# Patient Record
Sex: MALE | Race: WHITE | NOT HISPANIC OR LATINO | Employment: STUDENT | URBAN - METROPOLITAN AREA
[De-identification: names, ages, dates, MRNs, and addresses within clinical notes are randomized per-mention and may not be internally consistent; named-entity substitution may affect disease eponyms.]

---
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
## 2024-12-02 NOTE — PROGRESS NOTES
[facility]   [address], [address]  [place] [address]  [phone]  Courtney Fowler, MSN, APRN, PMHNP-BC    NAME: Mariano Ashby     : 2005   MRN: [id number]     Patient Care Team:  Mary Hankins MD as PCP - General (Pediatrics)    DATE: 2024    -Patient was referred by: Jay Ewing LCS  -Psychiatric history packet turned in/reviewed : [x] Yes [] No    Subjective     CHIEF COMPLAINT: anxiety     HPI:  Mariano Ashby, a 19 y.o. male patient seen for the first time today for initial evaluation.    Referred by [facility]. Patient sees a therapist there, Gildardo Thompson. He has been seeing him for anxiety and depression. Reports he doesn't have a regular schedule, but just calls and schedules an appointment when he needs to talk.     He recently started on citalopram 10mg, about 2 weeks ago, by him primary care provider. Reports he was having panic attacks.     Symptoms of panic attacks: very nervous, fidgety, says he feels as though he can't breathe.   Triggers to anxiety: He says women are a trigger to him. He says he has anxiety around women, and he doesn't know how to act around them. He says he was made fun of a lot by women in high school.     Currently he lives with his mom. Reports they have a good relationship.     Other issues he states he has. Patient says he isn't very self-aware.  Says he worries he has bipolar disorder or a personality disorder. He says he has a hard time reading and responding to social cues. He says sometimes when he is talking to his mother, he will become annoyed or irritable with her for no particular reason. He reports that he is matos, and has rapid mood swings.     Patient states he excessively masturbates, every day since middle school. He has not talked to his therapist about it. He feels like they have a relationship where he would be embarrassed to talk about it. Patient states he  "does feel like it is an obsession and he feels he has to do it. He describes it as being \"addicted\" to it.     He currently works at [employer]. Says he will start talking to himself, while he is working. 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 He says coworkers will be alarmed that he is talking to himself. He has thoughts about someone hurting his mother. And he has rage filled thoughts about this. He will whisper about this to himself. It makes it difficult to work, he says. He is adamant that he is not talking to someone, as in responding to internal stimuli, and is in fact just talking to himself. He denies ever having any auditory or visual hallucinations. He did not bring up any delusional content. Denies paranoia, but does say he worries what others think of him.     Family Psychiatric History:   Grandfather's mother--he says he was told she had serious mental health issues. Possibly schizophrenia. He is unsure if she was officially diagnosed.   Cousins--he says all his cousins have anxiety problems.     Patient denies any episiodes of suzanne or hypomania in the past.     Childhood: He states childhood was \"alright\". He says he was never abused, and his parents both loved him, but said their relationship was \"toxic\". They  when he was 5. He still talks to his dad often now. He made some comments about the marriage, but asked me to not \"put them in the official record\" in case what his mother had told him wasn't true.     Patient states he has a history of suicidal thoughts in the past. States he had thoughts, and almost made an attempt. Around 4th-6th grade. He again told me what the attempt was, but ask that I not put it in his chart. He say at the time that occurred, he never told anyone about it. I attempted to reassure him that my note taking, was simply to have a record of his psychiatric history, and be able to refer back to it in the future to refresh my memory. No one has access to the record, unless he gives them " "access to MyChart. I educated him on the things we are required to disclose by law, such as suicidal thoughts with intent, or homicidal thoughts with intent, but everything else he talked about would be kept private and confidential.     Patient states everyone thought he was \"weird\" growing up and did not treat him well.     Patient states he is \"always stressed, always anxious, always angry.\"  Stressors: work, relationships.     He states he has never had a relationship. Says he gets nervous around women and he doesn't know how to approach them or make conversation.   States he has good friends from high school/    Hobbies: video games, TV.       SYMPTOMS:      MOOD: down/low     SLEEP: poor     ENERGY: average      CONCENTRATION/FOCUS: extremely poor     APPETITE: poor    PRIOR PSYCH MEDICATIONS:  Adderall--he was on this as a child  Zoloft--middle or high school  He says he has been on several medications in the past, but he doesn't remember the name.     PRIOR PSYCH DX:   Depression   Anxiety   ADHD--diagnosed as a child.     PRIOR MENTAL HEALTH PROVIDERS:  Gildardo Thompson LCSW--currently sees him.   Patient has other providers in the past, when he was a child/teen.     PSYCH ADMISSIONS:   He denies any psychiatric admissions.     SELF HARM/SUICIDALLY:   Denies any current SI with any plan or intent.     SOCIAL HISTORY:  Relationships: patient not in a relationship.   Education: Graduated high school.   Developmenal HX (504, IEP, milestones, complications at birth): He reports he was evaluated for autism as a child and was told he didn't have it.   Occupation: Works at [employer].   Living Arrangements: lives with his mom.   Trauma (emotional, psychological, physical, sexual) : Denies any history of trauma.   Legal: denies   Hobbies: video games.      SUBSTANCE USE:   Nicotine/Tobacco: E-cigarettes  Alcohol: socially   Illicit drugs: denies any past use   Cannabis/Marijuana: he uses marijuana   Caffeine: he drinks " "caffeine.     SEIZURE HISTORY: No    PLAN OF CARE:  Advised patient we could do a trial of increasing citalopram to 20mg. He was agreeable to this. Advised we could refer him to [facility] for further psychological testing. Patient is interested in testing for autism and potentially for personality disorders.     I also encouraged the patient to schedule a regular appointment with his therapist and discuss some of the things he discussed with me today, specifically the excessive masturbation, as this is distressing to him, and he is not sure how to cope with it.     Patient presents with symptoms and behaviors that are consistent with the following DSM-5 diagnoses:  Major depressive disorder  2. Generalized anxiety disorder   3. OCD     Ability and capacity to respond to treatment: fair  Functional status: fair  Prognosis: fair  Long term goals: improve depression and overall quality of life. , improve anxiety and overall quality of life. , and decreased distressing symptoms.   Short term goals:reduce anxiety. , improve depression. , and decrease irritability.     Objective     /62   Pulse 72   Ht 167.6 cm (65.98\")   Wt 67.6 kg (149 lb)   SpO2 98%   BMI 24.06 kg/m²   No LMP for male patient.    Social History     Occupational History    Not on file   Tobacco Use    Smoking status: Every Day     Types: Cigarettes    Smokeless tobacco: Never   Vaping Use    Vaping status: Every Day    Substances: Nicotine, THC, Flavoring    Devices: Disposable   Substance and Sexual Activity    Alcohol use: Yes     Comment: socially    Drug use: Yes     Frequency: 1.0 times per week     Types: Marijuana    Sexual activity: Not Currently     History reviewed. No pertinent family history.   Past Medical History:   Diagnosis Date    ADHD (attention deficit hyperactivity disorder)     Anxiety      History reviewed. No pertinent surgical history.   Review of Systems     The following portions of the patient's history " were reviewed and updated as appropriate: allergies, current medications, past family history, past medical history, past social history, past surgical history and problem list.      Allergy:   No Known Allergies     Discontinued Medications:  Medications Discontinued During This Encounter   Medication Reason    loratadine (Claritin) 5 MG chewable tablet     promethazine-dextromethorphan (PROMETHAZINE-DM) 6.25-15 MG/5ML syrup     citalopram (CeleXA) 10 MG tablet Reorder       Current Medications:   Current Outpatient Medications   Medication Sig Dispense Refill    citalopram (CeleXA) 20 MG tablet Take 1 tablet by mouth Daily. 30 tablet 2     No current facility-administered medications for this visit.     MENTAL STATUS EXAM   General Appearance:  Cleanly groomed and dressed  Eye Contact:  Poor eye contact  Attitude:  Cooperative  Motor Activity: Patient has some head/neck movements, similar to a tic.  Muscle Strength:  Normal  Speech:  Normal rate, tone, volume  Language:  Spontaneous  Mood and affect:  Flat and constricted  Hopelessness:  Denies  Loneliness: Patient does endorse loneliness.  Thought Process:  Linear  Associations/ Thought Content:  Hypersexual  Hallucinations: Patient denies. He does describe episodes of talking to himself at work, that sound similar to responding to internal stimuli. However, I did not observe this today.  Suicidal Ideations:  Not present  Homicidal Ideation:  Not present  Sensorium:  Alert  Orientation:  Person, place and time  Immediate Recall, Recent, and Remote Memory:  Intact  Attention Span/ Concentration:  Easily distracted  Fund of Knowledge:  Appropriate for age and educational level  Intellectual Functioning:  Average range  Insight:  Fair  Judgement:  Fair  Reliability:  Fair  Impulse Control:  Fair     PHQ-9 Depression Screening  Little interest or pleasure in doing things? Several days   Feeling down, depressed, or hopeless? Several days   PHQ-2 Total Score 2    Trouble falling or staying asleep, or sleeping too much? Almost all   Feeling tired or having little energy? Several days   Poor appetite or overeating? Over half   Feeling bad about yourself - or that you are a failure or have let yourself or your family down? Almost all   Trouble concentrating on things, such as reading the newspaper or watching television? Not at all   Moving or speaking so slowly that other people could have noticed? Or the opposite - being so fidgety or restless that you have been moving around a lot more than usual? Almost all   Thoughts that you would be better off dead, or of hurting yourself in some way? Not at all   PHQ-9 Total Score 14   If you checked off any problems, how difficult have these problems made it for you to do your work, take care of things at home, or get along with other people? Very difficult     GAD7 Documentation:  Feeling nervous, anxious or on edge 3   Not being able to stop or control worrying 3   Worrying too much about different things 3   Trouble relaxing 2   Being so restless that it is hard to sit still 3   Becoming easily annoyed or irritable 3   Feeling Afraid as if something awful might happen 3   KIMBERLEY Total Score 20   How difficult have these problems made it for you? Very difficult     Current every day smoker less than 3 minutes spent counseling Not agreeable to stopping    I advised Mariano of the risks of tobacco use.     Result Review:    Labs:  No visits with results within 3 Month(s) from this visit.   Latest known visit with results is:   Admission on 12/02/2023, Discharged on 12/02/2023   Component Date Value Ref Range Status    SARS Antigen 12/02/2023 Detected (A)  Not Detected, Presumptive Negative Final    Influenza A Antigen JEWELS 12/02/2023 Not Detected  Not Detected Final    Influenza B Antigen JEWELS 12/02/2023 Not Detected  Not Detected Final    Internal Control 12/02/2023 Passed  Passed Final    Lot Number 12/02/2023 [id number]   Final     Expiration Date 12/02/2023 11/1/24   Final       Assessment & Plan   Diagnoses and all orders for this visit:    1. Obsessive-compulsive disorder, unspecified type (Primary)    2. Major depressive disorder, recurrent episode, moderate  -     Ambulatory Referral to Behavioral Health  -     citalopram (CeleXA) 20 MG tablet; Take 1 tablet by mouth Daily.  Dispense: 30 tablet; Refill: 2    3. Generalized anxiety disorder  -     citalopram (CeleXA) 20 MG tablet; Take 1 tablet by mouth Daily.  Dispense: 30 tablet; Refill: 2       Continue citalopram, increase to 20mg.   Referral to [facility] for psychological testing.   Encouraged patient to schedule follow up/regular appointment with his therapist and discuss the symptoms that are distressing to him.     Visit Diagnoses:    ICD-10-CM ICD-9-CM   1. Obsessive-compulsive disorder, unspecified type  F42.9 300.3   2. Major depressive disorder, recurrent episode, moderate  F33.1 296.32   3. Generalized anxiety disorder  F41.1 300.02       The above listed condition/conditions are no improvement  (new patient) with treatment, moderate intensity.  Pt history, review of systems, medications, allergies, reviewed, patient was screened today for depression/anxiety, PHQ/KIBMERLEY scores reviewed.  Most recent vitals/labs reviewed.  Pt was given appropriate time to ask questions and concerns were addressed. A thorough discussion was had that included review of disease process, need for continued monitoring and additional treatment options including use of pharmacological and non-pharmacological approaches to care, decisions were made and agreed upon by patient and provider.   Discussed the risks, benefits, and potential side effects of the medications; patient ackowledged and verbally consented.     TREATMENT PLAN/GOALS: Continue supportive psychotherapy efforts and medications as indicated. Treatment and medication options discussed during today's visit. Patient ackowledged  and verbally consented to continue with current treatment plan and was educated on the importance of compliance with treatment and follow-up appointments.    -Short-Term Goals: Patient will be compliant with medication management and note improvement in S/S over the next 4 to 6 weeks or at next scheduled visit.  -Long-Term Goals: Patient will be compliant with the agreed treatment plan including medication regimen & F/U appt's and deny impairment in daily functioning over the next 6 months.      CRISIS RESOURCES:    In the event you have personal crisis, there are several resources to reach someone to talk with:    709 Suicide and Crisis Lifeline  Call or text 108 or chat [employer]  [facility]'s National Helpline  8-691-651-HELP (4357)  Text your zip code to 802746 (HELP4U)  [place] Crisis Line  Dial 854, then press 1  Text 892487    No show policy:  We understand unexpected circumstances arise; however, anytime you miss your appointment we are unable to provide you appropriate care.  In addition, each appointment missed could have been used to provide care for others.  We ask that you call at least 24 hours in advance to cancel or reschedule an appointment. We would like to take this opportunity to remind you of our policy stating patients who miss THREE appointments without cancelling or rescheduling 24 hours in advance of the appointment may be subject to cancellation of any further visits with our clinic. Please call [phone] to reschedule your appointment. If there are reasons that make it difficult for you to keep the appointments, please call and let us know how we can help. Please understand that medication prescribing will not continue without seeing your provider.        MEDICATION ISSUES:  INSPECT reviewed as expected    Discussed medication options and treatment plan of prescribed medication as well as the risks, benefits, and side effects including potential falls, possible impaired driving and  metabolic adversities among others. Patient is agreeable to call the office with any worsening of symptoms or onset of side effects. Patient is agreeable to call 911 or go to the nearest ER should he/she begin having SI/HI. No medication side effects or related complaints today.     MEDS ORDERED DURING VISIT:  New Medications Ordered This Visit   Medications    citalopram (CeleXA) 20 MG tablet     Sig: Take 1 tablet by mouth Daily.     Dispense:  30 tablet     Refill:  2       Return in about 3 months (around 3/5/2025).         This document has been electronically signed by TRINA Joyner  December 5, 2024 19:19 EST    Part of this note may be an electronic transcription/translation of spoken language to printed text using the Dragon Dictation System. Some of the data in this electronic note has been brought forward from a previous encounter, any necessary changes have been made, it has been reviewed by this APRN, and it is accurate.

## 2024-12-05 ENCOUNTER — OFFICE VISIT (OUTPATIENT)
Dept: PSYCHIATRY | Facility: CLINIC | Age: 19
End: 2024-12-05
Payer: COMMERCIAL

## 2024-12-05 VITALS
HEART RATE: 72 BPM | OXYGEN SATURATION: 98 % | SYSTOLIC BLOOD PRESSURE: 106 MMHG | HEIGHT: 66 IN | WEIGHT: 149 LBS | DIASTOLIC BLOOD PRESSURE: 62 MMHG | BODY MASS INDEX: 23.95 KG/M2

## 2024-12-05 DIAGNOSIS — F33.1 MAJOR DEPRESSIVE DISORDER, RECURRENT EPISODE, MODERATE: Chronic | ICD-10-CM

## 2024-12-05 DIAGNOSIS — F42.9 OBSESSIVE-COMPULSIVE DISORDER, UNSPECIFIED TYPE: Primary | Chronic | ICD-10-CM

## 2024-12-05 DIAGNOSIS — F41.1 GENERALIZED ANXIETY DISORDER: Chronic | ICD-10-CM

## 2024-12-05 RX ORDER — CITALOPRAM HYDROBROMIDE 10 MG/1
10 TABLET ORAL DAILY
COMMUNITY
End: 2024-12-05 | Stop reason: SDUPTHER

## 2024-12-05 RX ORDER — CITALOPRAM HYDROBROMIDE 20 MG/1
20 TABLET ORAL DAILY
Qty: 30 TABLET | Refills: 2 | Status: SHIPPED | OUTPATIENT
Start: 2024-12-05